# Patient Record
Sex: MALE | Race: WHITE
[De-identification: names, ages, dates, MRNs, and addresses within clinical notes are randomized per-mention and may not be internally consistent; named-entity substitution may affect disease eponyms.]

---

## 2017-12-20 ENCOUNTER — SURGERY (OUTPATIENT)
Age: 27
End: 2017-12-20

## 2017-12-20 ENCOUNTER — HOSPITAL ENCOUNTER (OUTPATIENT)
Facility: CLINIC | Age: 27
Discharge: HOME | End: 2017-12-20
Attending: OPHTHALMOLOGY | Admitting: OPHTHALMOLOGY
Payer: COMMERCIAL

## 2017-12-20 ENCOUNTER — ANESTHESIA (OUTPATIENT)
Dept: SURGERY | Facility: CLINIC | Age: 27
End: 2017-12-20
Payer: COMMERCIAL

## 2017-12-20 ENCOUNTER — ANESTHESIA EVENT (OUTPATIENT)
Dept: SURGERY | Facility: CLINIC | Age: 27
End: 2017-12-20
Payer: COMMERCIAL

## 2017-12-20 VITALS
HEIGHT: 69 IN | TEMPERATURE: 99.7 F | BODY MASS INDEX: 22.36 KG/M2 | WEIGHT: 151 LBS | DIASTOLIC BLOOD PRESSURE: 82 MMHG | SYSTOLIC BLOOD PRESSURE: 143 MMHG | OXYGEN SATURATION: 95 % | RESPIRATION RATE: 16 BRPM

## 2017-12-20 DIAGNOSIS — H50.05 ESOTROPIA, ALTERNATING: Primary | ICD-10-CM

## 2017-12-20 PROCEDURE — 27210794 ZZH OR GENERAL SUPPLY STERILE: Performed by: OPHTHALMOLOGY

## 2017-12-20 PROCEDURE — 25000125 ZZHC RX 250: Performed by: ANESTHESIOLOGY

## 2017-12-20 PROCEDURE — 25000128 H RX IP 250 OP 636: Performed by: OPHTHALMOLOGY

## 2017-12-20 PROCEDURE — 25000125 ZZHC RX 250: Performed by: NURSE ANESTHETIST, CERTIFIED REGISTERED

## 2017-12-20 PROCEDURE — 36000102 ZZH EYE SURGERY LEVEL 3 EA 15 ADDTL MIN: Performed by: OPHTHALMOLOGY

## 2017-12-20 PROCEDURE — 37000009 ZZH ANESTHESIA TECHNICAL FEE, EACH ADDTL 15 MIN: Performed by: OPHTHALMOLOGY

## 2017-12-20 PROCEDURE — 36000101 ZZH EYE SURGERY LEVEL 3 1ST 30 MIN: Performed by: OPHTHALMOLOGY

## 2017-12-20 PROCEDURE — 40000170 ZZH STATISTIC PRE-PROCEDURE ASSESSMENT II: Performed by: OPHTHALMOLOGY

## 2017-12-20 PROCEDURE — 25000128 H RX IP 250 OP 636: Performed by: NURSE ANESTHETIST, CERTIFIED REGISTERED

## 2017-12-20 PROCEDURE — 25000566 ZZH SEVOFLURANE, EA 15 MIN: Performed by: OPHTHALMOLOGY

## 2017-12-20 PROCEDURE — 25000128 H RX IP 250 OP 636: Performed by: ANESTHESIOLOGY

## 2017-12-20 PROCEDURE — 71000028 ZZH EYE RECOVERY PHASE 2 EACH 15 MINS: Performed by: OPHTHALMOLOGY

## 2017-12-20 PROCEDURE — 71000004 ZZH RECOVERY EYE PHASE 1 LEVEL 1 FIRST HR: Performed by: OPHTHALMOLOGY

## 2017-12-20 PROCEDURE — 25000125 ZZHC RX 250: Performed by: OPHTHALMOLOGY

## 2017-12-20 PROCEDURE — 37000008 ZZH ANESTHESIA TECHNICAL FEE, 1ST 30 MIN: Performed by: OPHTHALMOLOGY

## 2017-12-20 RX ORDER — FENTANYL CITRATE 50 UG/ML
INJECTION, SOLUTION INTRAMUSCULAR; INTRAVENOUS PRN
Status: DISCONTINUED | OUTPATIENT
Start: 2017-12-20 | End: 2017-12-20

## 2017-12-20 RX ORDER — LIDOCAINE HYDROCHLORIDE 20 MG/ML
INJECTION, SOLUTION INFILTRATION; PERINEURAL PRN
Status: DISCONTINUED | OUTPATIENT
Start: 2017-12-20 | End: 2017-12-20

## 2017-12-20 RX ORDER — BALANCED SALT SOLUTION 6.4; .75; .48; .3; 3.9; 1.7 MG/ML; MG/ML; MG/ML; MG/ML; MG/ML; MG/ML
SOLUTION OPHTHALMIC PRN
Status: DISCONTINUED | OUTPATIENT
Start: 2017-12-20 | End: 2017-12-20 | Stop reason: HOSPADM

## 2017-12-20 RX ORDER — DEXAMETHASONE SODIUM PHOSPHATE 4 MG/ML
INJECTION, SOLUTION INTRA-ARTICULAR; INTRALESIONAL; INTRAMUSCULAR; INTRAVENOUS; SOFT TISSUE PRN
Status: DISCONTINUED | OUTPATIENT
Start: 2017-12-20 | End: 2017-12-20

## 2017-12-20 RX ORDER — HYDROCODONE BITARTRATE AND ACETAMINOPHEN 5; 325 MG/1; MG/1
1 TABLET ORAL EVERY 6 HOURS PRN
Qty: 30 TABLET | Refills: 0 | Status: SHIPPED | OUTPATIENT
Start: 2017-12-20 | End: 2017-12-27

## 2017-12-20 RX ORDER — ONDANSETRON 2 MG/ML
INJECTION INTRAMUSCULAR; INTRAVENOUS PRN
Status: DISCONTINUED | OUTPATIENT
Start: 2017-12-20 | End: 2017-12-20

## 2017-12-20 RX ORDER — SODIUM CHLORIDE, SODIUM LACTATE, POTASSIUM CHLORIDE, CALCIUM CHLORIDE 600; 310; 30; 20 MG/100ML; MG/100ML; MG/100ML; MG/100ML
INJECTION, SOLUTION INTRAVENOUS CONTINUOUS
Status: DISCONTINUED | OUTPATIENT
Start: 2017-12-20 | End: 2017-12-20 | Stop reason: HOSPADM

## 2017-12-20 RX ORDER — PROPOFOL 10 MG/ML
INJECTION, EMULSION INTRAVENOUS PRN
Status: DISCONTINUED | OUTPATIENT
Start: 2017-12-20 | End: 2017-12-20

## 2017-12-20 RX ORDER — NEOMYCIN SULFATE, POLYMYXIN B SULFATE AND DEXAMETHASONE 3.5; 10000; 1 MG/ML; [USP'U]/ML; MG/ML
SUSPENSION/ DROPS OPHTHALMIC PRN
Status: DISCONTINUED | OUTPATIENT
Start: 2017-12-20 | End: 2017-12-20 | Stop reason: HOSPADM

## 2017-12-20 RX ORDER — NALOXONE HYDROCHLORIDE 0.4 MG/ML
.1-.4 INJECTION, SOLUTION INTRAMUSCULAR; INTRAVENOUS; SUBCUTANEOUS
Status: DISCONTINUED | OUTPATIENT
Start: 2017-12-20 | End: 2017-12-20 | Stop reason: HOSPADM

## 2017-12-20 RX ORDER — KETOROLAC TROMETHAMINE 30 MG/ML
INJECTION, SOLUTION INTRAMUSCULAR; INTRAVENOUS PRN
Status: DISCONTINUED | OUTPATIENT
Start: 2017-12-20 | End: 2017-12-20

## 2017-12-20 RX ORDER — FENTANYL CITRATE 50 UG/ML
25-50 INJECTION, SOLUTION INTRAMUSCULAR; INTRAVENOUS
Status: DISCONTINUED | OUTPATIENT
Start: 2017-12-20 | End: 2017-12-20 | Stop reason: HOSPADM

## 2017-12-20 RX ORDER — MEPERIDINE HYDROCHLORIDE 25 MG/ML
12.5 INJECTION INTRAMUSCULAR; INTRAVENOUS; SUBCUTANEOUS
Status: DISCONTINUED | OUTPATIENT
Start: 2017-12-20 | End: 2017-12-20 | Stop reason: HOSPADM

## 2017-12-20 RX ORDER — HYDROMORPHONE HYDROCHLORIDE 1 MG/ML
.3-.5 INJECTION, SOLUTION INTRAMUSCULAR; INTRAVENOUS; SUBCUTANEOUS EVERY 10 MIN PRN
Status: DISCONTINUED | OUTPATIENT
Start: 2017-12-20 | End: 2017-12-20 | Stop reason: HOSPADM

## 2017-12-20 RX ORDER — ONDANSETRON 4 MG/1
4 TABLET, ORALLY DISINTEGRATING ORAL EVERY 30 MIN PRN
Status: DISCONTINUED | OUTPATIENT
Start: 2017-12-20 | End: 2017-12-20 | Stop reason: HOSPADM

## 2017-12-20 RX ORDER — NEOMYCIN POLYMYXIN B SULFATES AND DEXAMETHASONE 3.5; 10000; 1 MG/ML; [USP'U]/ML; MG/ML
1 SUSPENSION/ DROPS OPHTHALMIC 3 TIMES DAILY
Qty: 1 BOTTLE | Refills: 1 | Status: SHIPPED | OUTPATIENT
Start: 2017-12-20 | End: 2017-12-27

## 2017-12-20 RX ORDER — DEXAMETHASONE SODIUM PHOSPHATE 4 MG/ML
INJECTION, SOLUTION INTRA-ARTICULAR; INTRALESIONAL; INTRAMUSCULAR; INTRAVENOUS; SOFT TISSUE PRN
Status: DISCONTINUED | OUTPATIENT
Start: 2017-12-20 | End: 2017-12-20 | Stop reason: HOSPADM

## 2017-12-20 RX ORDER — ONDANSETRON 2 MG/ML
4 INJECTION INTRAMUSCULAR; INTRAVENOUS EVERY 30 MIN PRN
Status: DISCONTINUED | OUTPATIENT
Start: 2017-12-20 | End: 2017-12-20 | Stop reason: HOSPADM

## 2017-12-20 RX ADMIN — LIDOCAINE HYDROCHLORIDE 100 MG: 20 INJECTION, SOLUTION INFILTRATION; PERINEURAL at 12:23

## 2017-12-20 RX ADMIN — MIDAZOLAM 2 MG: 1 INJECTION INTRAMUSCULAR; INTRAVENOUS at 12:20

## 2017-12-20 RX ADMIN — PROPOFOL 200 MG: 10 INJECTION, EMULSION INTRAVENOUS at 12:23

## 2017-12-20 RX ADMIN — FENTANYL CITRATE 50 MCG: 50 INJECTION, SOLUTION INTRAMUSCULAR; INTRAVENOUS at 12:23

## 2017-12-20 RX ADMIN — ONDANSETRON 4 MG: 2 INJECTION INTRAMUSCULAR; INTRAVENOUS at 12:34

## 2017-12-20 RX ADMIN — SODIUM CHLORIDE, POTASSIUM CHLORIDE, SODIUM LACTATE AND CALCIUM CHLORIDE: 600; 310; 30; 20 INJECTION, SOLUTION INTRAVENOUS at 10:38

## 2017-12-20 RX ADMIN — FENTANYL CITRATE 50 MCG: 50 INJECTION, SOLUTION INTRAMUSCULAR; INTRAVENOUS at 12:33

## 2017-12-20 RX ADMIN — DEXAMETHASONE SODIUM PHOSPHATE 0.1 ML: 4 INJECTION, SOLUTION INTRA-ARTICULAR; INTRALESIONAL; INTRAMUSCULAR; INTRAVENOUS; SOFT TISSUE at 12:39

## 2017-12-20 RX ADMIN — LIDOCAINE HYDROCHLORIDE 1 ML: 10 INJECTION, SOLUTION EPIDURAL; INFILTRATION; INTRACAUDAL; PERINEURAL at 10:38

## 2017-12-20 RX ADMIN — PROPOFOL 80 MG: 10 INJECTION, EMULSION INTRAVENOUS at 12:28

## 2017-12-20 RX ADMIN — FENTANYL CITRATE 50 MCG: 50 INJECTION, SOLUTION INTRAMUSCULAR; INTRAVENOUS at 14:12

## 2017-12-20 RX ADMIN — NEOMYCIN SULFATE, POLYMYXIN B SULFATE AND DEXAMETHASONE 1 DROP: 3.5; 10000; 1 SUSPENSION OPHTHALMIC at 12:42

## 2017-12-20 RX ADMIN — BALANCED SALT SOLUTION 15 ML: 6.4; .75; .48; .3; 3.9; 1.7 SOLUTION OPHTHALMIC at 12:37

## 2017-12-20 RX ADMIN — DEXAMETHASONE SODIUM PHOSPHATE 4 MG: 4 INJECTION, SOLUTION INTRA-ARTICULAR; INTRALESIONAL; INTRAMUSCULAR; INTRAVENOUS; SOFT TISSUE at 12:34

## 2017-12-20 RX ADMIN — KETOROLAC TROMETHAMINE 30 MG: 30 INJECTION, SOLUTION INTRAMUSCULAR at 12:56

## 2017-12-20 ASSESSMENT — ENCOUNTER SYMPTOMS
SEIZURES: 0
ORTHOPNEA: 0
DYSRHYTHMIAS: 0

## 2017-12-20 NOTE — BRIEF OP NOTE
[facility] Brief Operative Note    Pre-operative diagnosis: ESOTROPIA   Post-operative diagnosis esotropia     Procedure: Procedure(s):  BILATERAL STRABISMUS REPAIR,RIGHT EYE ADJUSTABLE SUTURE - Wound Class: I-Clean   - Wound Class: I-Clean   Surgeon(s): Surgeon(s) and Role:     * Rich Leach MD - Primary   Estimated blood loss: 2 mL    Specimens: * No specimens in log *   Findings: esotropia

## 2017-12-20 NOTE — DISCHARGE INSTRUCTIONS
[facility] Discharge  Instructions  Anesthesia ([facility])   Adult Discharge Instructions (General)    For 24 hours after surgery    1. Get plenty of rest.  Make arrangements to have a responsible adult stay with you for at least 24 hours.  2. Do not drive or use heavy equipment for 24 hours.    3. Do not drink alcohol for 24 hours.  4. Do not sign legal documents or make important decisions for 24 hours.  5. Avoid strenuous or risky activities. You may feel lightheaded.  If so, sit for a few minutes before standing.  Have someone help you get up.   6. General anesthesia patients should drink only clear liquids (apple juice, ginger ale, broth or 7-Up). Be sure to drink enough fluids.  Move to a regular diet as you feel able.  7. Any questions of medical nature, call your physician.    While you were at the hospital today you received Toradol, an antiinflammatory medication similar to Ibuprofen.  You should not take other antiinflammatory medication, such as Ibuprofen, Motrin, Advil, Aleve, Naprosyn, etc, until 7:00 p.m.    [facility]  Discharge Instructions after Strabismus Repair-Adult  Rich Leach M.D.          You may be nauseated after surgery.  Lying down or sitting quietly often helps to relieve this.      Try sips of clear liquids or popsicles frequently for the first few hours after arriving home.  If you tolerate liquids and are not nauseated, add soft and bland foods to the diet.  Advance to regular diet as tolerated.  Return to clear liquids if you cannot tolerate solid foods.      Avoid rubbing the eyes.  Eye redness is normal and typically is worse the        second day after surgery. This gradually disappears in one to two weeks.       Do not use tap water near the eyes for one week.  Use a dry cloth or sterile saline from the pharmacy, if needed.      Restrict activity for three days.  NO heavy lifting (greater than 20 pounds) or  swimming for one week (or until approved by your doctor).      Maxitrol drops/ointment has been prescribed.  Starting tomorrow morning, apply as directed three times a day until further notice.    Vicodin or other oral pain medications have been written.  Most patients do not need to fill this prescriptions.  However, take the prescription home and fill if necessary.    Your post-operative appointment is typically in 1-2 days if adjustable sutures were used. If not, the appointment is usually within two weeks.      [facility] Phone Number: [phone]

## 2017-12-20 NOTE — ADDENDUM NOTE
Addendum  created 12/20/17 5390 by Joseline Miles APRN CRNA    Anesthesia Event edited, Anesthesia Intra Flowsheets edited, Procedure Event Log accessed

## 2017-12-20 NOTE — IP AVS SNAPSHOT
[facility]    [address]    [place] [address]    Phone:  [phone]    Fax:  [phone]                                       After Visit Summary   12/20/2017    Sha Mccain    MRN: [id number]           After Visit Summary Signature Page     I have received my discharge instructions, and my questions have been answered. I have discussed any challenges I see with this plan with the nurse or doctor.    ..........................................................................................................................................  Patient/Patient Representative Signature      ..........................................................................................................................................  Patient Representative Print Name and Relationship to Patient    ..................................................               ................................................  Date                                            Time    ..........................................................................................................................................  Reviewed by Signature/Title    ...................................................              ..............................................  Date                                                            Time

## 2017-12-20 NOTE — ANESTHESIA POSTPROCEDURE EVALUATION
Patient: Sha Mccain    Procedure(s):  BILATERAL STRABISMUS REPAIR,RIGHT EYE ADJUSTABLE SUTURE - Wound Class: I-Clean   - Wound Class: I-Clean    Diagnosis:ESOTROPIA  Diagnosis Additional Information: No value filed.    Anesthesia Type:  General, LMA    Note:  Anesthesia Post Evaluation    Patient location during evaluation: PACU  Patient participation: Able to fully participate in evaluation  Level of consciousness: awake  Pain management: adequate  Airway patency: patent  Cardiovascular status: acceptable  Respiratory status: acceptable  Hydration status: acceptable  PONV: none     Anesthetic complications: None          Last vitals:  Vitals:    12/20/17 1400 12/20/17 1415 12/20/17 1430   BP: 142/71 125/74 143/82   Resp: 16 16 16   Temp:      SpO2: 95% 95% 95%         Electronically Signed By: Peter Becker MD  December 20, 2017  3:29 PM

## 2017-12-20 NOTE — OR NURSING
Report called to Sarina RICO Pt transported per Peggy PEREZ Per [facility] chair to [facility].

## 2017-12-20 NOTE — ADDENDUM NOTE
Addendum  created 12/20/17 1605 by Joseline Miles APRN CRNA    Anesthesia Intra Meds edited, Orders acknowledged in Narrator

## 2017-12-20 NOTE — ANESTHESIA PREPROCEDURE EVALUATION
"Procedure: Procedure(s):  RECESSION RESECTION (REPAIR STRABISMUS) BILATERAL  RECESSION RESECTION WITH ADJUSTABLE SUTURE  Preop diagnosis: ESOTROPIA  Allergies   Allergen Reactions     Penicillins      There is no problem list on file for this patient.    Past Medical History:   Diagnosis Date     Strabismus      History reviewed. No pertinent surgical history.    No current facility-administered medications on file prior to encounter.   No current outpatient prescriptions on file prior to encounter.  BP (!) 146/95  Temp 36.1  C (97  F)  Resp 16  Ht 1.74 m (5' 8.5\")  Wt 68.5 kg (151 lb)  SpO2 97%  BMI 22.63 kg/m2      Anesthesia Evaluation     . Pt has not had prior anesthetic            ROS/MED HX    ENT/Pulmonary:  - neg pulmonary ROS    (-) sleep apnea and recent URI   Neurologic:  - neg neurologic ROS    (-) seizures, CVA and migraines   Cardiovascular:        (-) hypertension, orthopnea/PND and arrhythmias   METS/Exercise Tolerance:     Hematologic:  - neg hematologic  ROS       Musculoskeletal:  - neg musculoskeletal ROS       GI/Hepatic:  - neg GI/hepatic ROS      (-) GERD   Renal/Genitourinary:         Endo:      (-) Type II DM and thyroid disease   Psychiatric:  - neg psychiatric ROS       Infectious Disease:  - neg infectious disease ROS       Malignancy:         Other: Comment: strabismus                    Physical Exam  Normal systems: cardiovascular, pulmonary and dental    Airway   Mallampati: II  TM distance: >3 FB  Neck ROM: full    Dental     Cardiovascular   Rhythm and rate: regular and normal      Pulmonary    breath sounds clear to auscultation                    Anesthesia Plan      History & Physical Review  History and physical reviewed and following examination; no interval change.    ASA Status:  1 .    NPO Status:  > 8 hours    Plan for General and LMA with Propofol induction. Maintenance will be Balanced.    PONV prophylaxis:  Ondansetron (or other 5HT-3) and Dexamethasone or " Solumedrol       Postoperative Care  Postoperative pain management:  IV analgesics.      Consents  Anesthetic plan, risks, benefits and alternatives discussed with:  Patient..                          .

## 2017-12-21 NOTE — OP NOTE
DATE OF PROCEDURE:  12/20/2017       PREOPERATIVE DIAGNOSIS:  Esotropia.      POSTOPERATIVE DIAGNOSIS:  Esotropia.      PROCEDURE:  Bimedial rectus recession, 4.1 mm, both eyes, with adjustable suture on right eye.      ANESTHESIA:  LMA.      INDICATIONS FOR PROCEDURE:  Sha Mccain is a 27-year-old gentleman who has had increasing esotropia getting worse over time.  He is feeling like the crossing is getting worse and feels like his right eye is crossing more significantly every year.  He is not able to control it any more.  He has high myopia with -5.75 contact lenses.  His esotropia is significant with poor control at 25-30.  Because of this, he wished to have surgical correction as there would be no nonsurgical treatments that would be helpful for this condition.  After all indications, complications, risks, benefits and alternatives were discussed at length, informed consent was obtained for bimedial rectus recession with adjustable suture on right eye.  Specific risks discussed included bleeding, infection, anesthesia problems, damage to vision or eyes, double vision that does not go away and the possible need for further surgery in the future.      DESCRIPTION OF PROCEDURE:  The patient was brought to the operating room and administered anesthesia by LMA.  The patient was prepped and draped in the usual sterile manner.  Forced ductions were negative with both eyes being equally esotropic under anesthesia.  A peritomy was performed over the left medial rectus, which was found to be 5.5 mm posterior to the limbus.  The muscle was freed of all adhesions and check ligaments and placed on an Apt clamp.  The muscle was disinserted from the globe, and double-armed 6-0 Vicryl suture was threaded through the muscle and locked on each end.  The muscle was reattached to the sclera 9.6 mm posterior to the limbus, giving effective recession of 4.1 mm.  The muscle was tied down and found to be in the correct position.  " The conjunctiva was closed with interrupted 7-0 chromic suture and 0.1 mL of dexamethasone was instilled in the subconjunctival space.  All traction and marking sutures were removed along with the lid speculum.      The lid speculum was placed in the right eye and identical procedure performed on the right medial rectus.  The right medial rectus was isolated, disinserted and sewn in the same manner.  The sutures were then brought back through the original insertion, and the muscle was allowed to \"hang back\" 4.1 mm, placing it in a position identical to the left side.  The suture was tied in a slip knot at the insertion.  The muscle was found to be in the correct position.  The conjunctiva was closed over the adjustable suture with interrupted 7-0 chromic suture, and 0.1 mL of dexamethasone was instilled in the subconjunctival space.  All traction and marking sutures were removed along with the lid speculum.  Maxitrol drops were instilled into each eye, and the patient awoke from anesthesia and appeared to have tolerated the procedure well.        The patient will be on Maxitrol drops three times a day for a week and has a follow-up appointment the next day for possible adjustment.         GI VASQUEZ MD             D: 2017 15:12   T: 2017 06:25   MT: PATRICK#[id number]      Name:     VALORIE RIVAS   MRN:      [id number]        Account:        [id number]   :      1990           Procedure Date: 2017      Document: [id number]       cc: Peter Diallo OD   "

## 2022-09-05 NOTE — ANESTHESIA CARE TRANSFER NOTE
Patient: Sha Mccain    Procedure(s):  BILATERAL STRABISMUS REPAIR,RIGHT EYE ADJUSTABLE SUTURE - Wound Class: I-Clean   - Wound Class: I-Clean    Diagnosis: ESOTROPIA  Diagnosis Additional Information: No value filed.    Anesthesia Type:   General, LMA     Note:  Airway :Face Mask  Patient transferred to:PACU  Comments: Pt to PACU with O2 via mask, airway patent, VSS.  Report to RN.Handoff Report: Identifed the Patient, Identified the Reponsible Provider, Reviewed the pertinent medical history, Discussed the surgical course, Reviewed Intra-OP anesthesia mangement and issues during anesthesia, Set expectations for post-procedure period and Allowed opportunity for questions and acknowledgement of understanding      Vitals: (Last set prior to Anesthesia Care Transfer)    CRNA VITALS  12/20/2017 1254 - 12/20/2017 1330      12/20/2017             Pulse: 81    Ht Rate: 87    SpO2: 93 %    Resp Rate (set): 10                Electronically Signed By: SPENCER Meade CRNA  December 20, 2017  1:30 PM   There are no Wet Read(s) to document.

## (undated) DEVICE — PACK OCULOPLATIC SEN15OCFSD

## (undated) DEVICE — SOL WATER IRRIG 1000ML BOTTLE 2F7114

## (undated) DEVICE — SU SILK 6-0 G-1DA 18" 780G

## (undated) DEVICE — SYR 01ML 27GA 0.5" NDL TBC 309623

## (undated) DEVICE — DRAPE STERI INCISE 24X14" 1040

## (undated) DEVICE — GLOVE PROTEXIS MICRO 7.0  2D73PM70

## (undated) DEVICE — GLOVE PROTEXIS MICRO 6.5  2D73PM65

## (undated) DEVICE — SU VICRYL 6-0 S-29 12" J556G

## (undated) DEVICE — SU CHROMIC 7-0 TG100-8 18" 1744G

## (undated) DEVICE — LINEN TOWEL PACK X5 5464

## (undated) DEVICE — GLOVE PROTEXIS MICRO 6.0  2D73PM60

## (undated) RX ORDER — ONDANSETRON 2 MG/ML
INJECTION INTRAMUSCULAR; INTRAVENOUS
Status: DISPENSED
Start: 2017-12-20

## (undated) RX ORDER — BALANCED SALT SOLUTION 6.4; .75; .48; .3; 3.9; 1.7 MG/ML; MG/ML; MG/ML; MG/ML; MG/ML; MG/ML
SOLUTION OPHTHALMIC
Status: DISPENSED
Start: 2017-12-20

## (undated) RX ORDER — FENTANYL CITRATE 50 UG/ML
INJECTION, SOLUTION INTRAMUSCULAR; INTRAVENOUS
Status: DISPENSED
Start: 2017-12-20

## (undated) RX ORDER — LIDOCAINE HYDROCHLORIDE 20 MG/ML
INJECTION, SOLUTION EPIDURAL; INFILTRATION; INTRACAUDAL; PERINEURAL
Status: DISPENSED
Start: 2017-12-20

## (undated) RX ORDER — DEXAMETHASONE SODIUM PHOSPHATE 4 MG/ML
INJECTION, SOLUTION INTRA-ARTICULAR; INTRALESIONAL; INTRAMUSCULAR; INTRAVENOUS; SOFT TISSUE
Status: DISPENSED
Start: 2017-12-20

## (undated) RX ORDER — NEOMYCIN SULFATE, POLYMYXIN B SULFATE AND DEXAMETHASONE 3.5; 10000; 1 MG/ML; [USP'U]/ML; MG/ML
SUSPENSION/ DROPS OPHTHALMIC
Status: DISPENSED
Start: 2017-12-20

## (undated) RX ORDER — PROPOFOL 10 MG/ML
INJECTION, EMULSION INTRAVENOUS
Status: DISPENSED
Start: 2017-12-20

## (undated) RX ORDER — KETOROLAC TROMETHAMINE 30 MG/ML
INJECTION, SOLUTION INTRAMUSCULAR; INTRAVENOUS
Status: DISPENSED
Start: 2017-12-20